# Patient Record
Sex: MALE | Race: WHITE | Employment: PART TIME | URBAN - METROPOLITAN AREA
[De-identification: names, ages, dates, MRNs, and addresses within clinical notes are randomized per-mention and may not be internally consistent; named-entity substitution may affect disease eponyms.]

---
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
## 2019-07-12 ENCOUNTER — OFFICE VISIT (OUTPATIENT)
Dept: INTERNAL MEDICINE | Facility: CLINIC | Age: 19
End: 2019-07-12
Payer: COMMERCIAL

## 2019-07-12 VITALS
HEART RATE: 87 BPM | WEIGHT: 131 LBS | SYSTOLIC BLOOD PRESSURE: 126 MMHG | TEMPERATURE: 98.3 F | DIASTOLIC BLOOD PRESSURE: 76 MMHG | BODY MASS INDEX: 17.74 KG/M2 | HEIGHT: 72 IN | OXYGEN SATURATION: 97 % | RESPIRATION RATE: 22 BRPM

## 2019-07-12 DIAGNOSIS — Z00.00 ROUTINE GENERAL MEDICAL EXAMINATION AT A HEALTH CARE FACILITY: Primary | ICD-10-CM

## 2019-07-12 DIAGNOSIS — Z11.4 ENCOUNTER FOR SCREENING FOR HIV: ICD-10-CM

## 2019-07-12 DIAGNOSIS — Z23 NEED FOR HPV VACCINATION: ICD-10-CM

## 2019-07-12 DIAGNOSIS — Z11.3 SCREEN FOR STD (SEXUALLY TRANSMITTED DISEASE): ICD-10-CM

## 2019-07-12 PROBLEM — F19.20 POLYSUBSTANCE DEPENDENCE (H): Status: ACTIVE | Noted: 2017-10-05

## 2019-07-12 LAB
ALBUMIN SERPL-MCNC: 4.1 G/DL (ref 3.4–5)
ALP SERPL-CCNC: 77 U/L (ref 40–150)
ALT SERPL W P-5'-P-CCNC: 14 U/L (ref 0–70)
ANION GAP SERPL CALCULATED.3IONS-SCNC: 6 MMOL/L (ref 3–14)
AST SERPL W P-5'-P-CCNC: 9 U/L (ref 0–45)
BILIRUB SERPL-MCNC: 0.8 MG/DL (ref 0.2–1.3)
BUN SERPL-MCNC: 7 MG/DL (ref 7–30)
CALCIUM SERPL-MCNC: 8.9 MG/DL (ref 8.5–10.1)
CHLORIDE SERPL-SCNC: 108 MMOL/L (ref 94–109)
CO2 SERPL-SCNC: 29 MMOL/L (ref 20–32)
CREAT SERPL-MCNC: 0.95 MG/DL (ref 0.66–1.25)
ERYTHROCYTE [DISTWIDTH] IN BLOOD BY AUTOMATED COUNT: 12.8 % (ref 10–15)
GFR SERPL CREATININE-BSD FRML MDRD: >90 ML/MIN/{1.73_M2}
GLUCOSE SERPL-MCNC: 89 MG/DL (ref 70–99)
HCT VFR BLD AUTO: 42.9 % (ref 40–53)
HGB BLD-MCNC: 14.7 G/DL (ref 13.3–17.7)
MCH RBC QN AUTO: 30.2 PG (ref 26.5–33)
MCHC RBC AUTO-ENTMCNC: 34.3 G/DL (ref 31.5–36.5)
MCV RBC AUTO: 88 FL (ref 78–100)
PLATELET # BLD AUTO: 252 10E9/L (ref 150–450)
POTASSIUM SERPL-SCNC: 3.6 MMOL/L (ref 3.4–5.3)
PROT SERPL-MCNC: 6.8 G/DL (ref 6.8–8.8)
RBC # BLD AUTO: 4.87 10E12/L (ref 4.4–5.9)
SODIUM SERPL-SCNC: 143 MMOL/L (ref 133–144)
WBC # BLD AUTO: 9.6 10E9/L (ref 4–11)

## 2019-07-12 PROCEDURE — 87389 HIV-1 AG W/HIV-1&-2 AB AG IA: CPT | Performed by: INTERNAL MEDICINE

## 2019-07-12 PROCEDURE — 87491 CHLMYD TRACH DNA AMP PROBE: CPT | Performed by: INTERNAL MEDICINE

## 2019-07-12 PROCEDURE — 80053 COMPREHEN METABOLIC PANEL: CPT | Performed by: INTERNAL MEDICINE

## 2019-07-12 PROCEDURE — 90471 IMMUNIZATION ADMIN: CPT | Performed by: INTERNAL MEDICINE

## 2019-07-12 PROCEDURE — 87591 N.GONORRHOEAE DNA AMP PROB: CPT | Performed by: INTERNAL MEDICINE

## 2019-07-12 PROCEDURE — 90651 9VHPV VACCINE 2/3 DOSE IM: CPT | Performed by: INTERNAL MEDICINE

## 2019-07-12 PROCEDURE — 85027 COMPLETE CBC AUTOMATED: CPT | Performed by: INTERNAL MEDICINE

## 2019-07-12 PROCEDURE — 36415 COLL VENOUS BLD VENIPUNCTURE: CPT | Performed by: INTERNAL MEDICINE

## 2019-07-12 PROCEDURE — 99395 PREV VISIT EST AGE 18-39: CPT | Mod: 25 | Performed by: INTERNAL MEDICINE

## 2019-07-12 RX ORDER — CETIRIZINE HYDROCHLORIDE 10 MG/1
10 TABLET ORAL DAILY PRN
COMMUNITY
Start: 2019-07-12

## 2019-07-12 ASSESSMENT — PATIENT HEALTH QUESTIONNAIRE - PHQ9
10. IF YOU CHECKED OFF ANY PROBLEMS, HOW DIFFICULT HAVE THESE PROBLEMS MADE IT FOR YOU TO DO YOUR WORK, TAKE CARE OF THINGS AT HOME, OR GET ALONG WITH OTHER PEOPLE: NOT DIFFICULT AT ALL
SUM OF ALL RESPONSES TO PHQ QUESTIONS 1-9: 8
SUM OF ALL RESPONSES TO PHQ QUESTIONS 1-9: 8

## 2019-07-12 ASSESSMENT — MIFFLIN-ST. JEOR: SCORE: 1642.21

## 2019-07-12 NOTE — PROGRESS NOTES
Dr España's note        ASSESSMENT & PLAN:                                                      (Z00.00) Routine general medical examination at a health care facility  (primary encounter diagnosis)  Comment:   Plan: CBC with platelets, Comprehensive metabolic         panel            (R79.89) Creatinine elevation  Comment:   Plan: CBC with platelets, Comprehensive metabolic         panel            (Z11.3) Screen for STD (sexually transmitted disease)  Comment:   Plan: NEISSERIA GONORRHOEA PCR, CHLAMYDIA TRACHOMATIS        PCR            (Z23) Need for HPV vaccination  Comment:   Plan: HPV, IM (9 - 26 YRS) - Gardasil 9,         THER/PROPH/DIAG INJ, SC/IM            (Z11.4) Encounter for screening for HIV  Comment:   Plan: **HIV Antigen Antibody Combo FUTURE anytime                 Chief Complaint:                                                      Annual exam  Follow up chronic medical problems      SUBJECTIVE:                                                    History of present illness     We reviewed the chronic medical problems as above.   I reviewed the recent tests results in Epic     Dec labs abnormal - labs abnormal.     *Possible poison ivy on both lower legs? Was walking through the woods last night and the night before, noticed it this morning.       ROS:    See below      PMHx: - reviewed  Past Medical History:   Diagnosis Date     ADHD (attention deficit hyperactivity disorder)      Depression      Depressive disorder      Uncomplicated asthma          PSHx: reviewed  History reviewed. No pertinent surgical history.     Soc Hx: No daily alcohol, no smoking  Social History     Socioeconomic History     Marital status: Single     Spouse name: Not on file     Number of children: Not on file     Years of education: Not on file     Highest education level: Not on file   Occupational History     Not on file   Social Needs     Financial resource strain: Not on file     Food insecurity:     Worry: Not on  file     Inability: Not on file     Transportation needs:     Medical: Not on file     Non-medical: Not on file   Tobacco Use     Smoking status: Light Tobacco Smoker     Years: 0.60     Types: Cigarettes     Smokeless tobacco: Never Used   Substance and Sexual Activity     Alcohol use: Yes     Drug use: Yes     Types: Marijuana     Sexual activity: Yes     Partners: Female     Comment: catarino march 2015   Lifestyle     Physical activity:     Days per week: Not on file     Minutes per session: Not on file     Stress: Not on file   Relationships     Social connections:     Talks on phone: Not on file     Gets together: Not on file     Attends Taoist service: Not on file     Active member of club or organization: Not on file     Attends meetings of clubs or organizations: Not on file     Relationship status: Not on file     Intimate partner violence:     Fear of current or ex partner: Not on file     Emotionally abused: Not on file     Physically abused: Not on file     Forced sexual activity: Not on file   Other Topics Concern     Parent/sibling w/ CABG, MI or angioplasty before 65F 55M? Not Asked   Social History Narrative     Not on file        Fam Hx: reviewed  Family History   Problem Relation Age of Onset     Diabetes Maternal Grandfather      Prostate Cancer Maternal Grandfather      Myocardial Infarction Maternal Grandfather      Thyroid Disease Paternal Grandfather      Myocardial Infarction Paternal Grandfather      Eye Disorder Paternal Aunt      Cancer Paternal Grandmother         smoker     Lung Cancer Paternal Grandmother      Hypertension Maternal Grandmother      Alzheimer Disease Maternal Grandmother      Hypertension Maternal Uncle      Hypertension Paternal Aunt      Neurologic Disorder Brother         ADHD, sees Cortney Canchola     No Known Problems Mother      No Known Problems Father          Screening: reviewed    All: reviewed    Meds: reviewed  Current Outpatient Medications   Medication Sig  Dispense Refill     cetirizine (ZYRTEC) 10 MG tablet Take 1 tablet (10 mg) by mouth daily as needed for allergies             OBJECTIVE:                                                    Physical Exam :  Blood pressure 126/76, pulse 87, temperature 98.3  F (36.8  C), temperature source Oral, resp. rate 22, height 1.829 m (6'), weight 59.4 kg (131 lb), SpO2 97 %.   NAD, appears comfortable  Skin clear, no rashes  HEENT: PERRLA, EOMI, anicteric sclera, pink conjunctiva, external ears appear normal, bilateral tympanic membranes clinically normal, oropharynx normal color.  Neck: supple, no JVD,  no thyroidmegaly  Lymph nodes non palpable in the cervical, supraclavicular axillaries,   Chest: clear to auscultation with good respiratory effort  Cardiac: S1S2, RRR, no mgr appreciated  Abdomen: soft, not tender, not distended, audible bowel sound, no hepatosplenomegaly, no palpable masses, no abdominal bruits  Extremities: no cyanosis, clubbing or edema.   Neuro: A, Ox3, no focal signs.  Breast exam no gynecomastia, no masses  Genital exam.  He denies any genital lesions, testicular abnormality      Cris España MD  Internal Medicine     SUBJECTIVE:   CC: Dylan Montanez is an 19 year old male who presents for preventative health visit.     *Possible poison ivy on both lower legs? Was walking through the woods last night and the night before, noticed it this morning.       Healthy Habits:     Getting at least 3 servings of Calcium per day:  NO    Bi-annual eye exam:  Yes    Dental care twice a year:  NO    Sleep apnea or symptoms of sleep apnea:  None    Diet:  Regular (no restrictions)    Frequency of exercise:  2-3 days/week    Duration of exercise:  15-30 minutes    Taking medications regularly:  Yes    Medication side effects:  None    PHQ-2 Total Score: 2    Additional concerns today:  No      Today's PHQ-2 Score:   PHQ-2 ( 1999 Pf[employer]) 7/12/2019   Q1: Little interest or pleasure in doing things 1   Q2: Feeling  "down, depressed or hopeless 1   PHQ-2 Score 2   Q1: Little interest or pleasure in doing things Several days   Q2: Feeling down, depressed or hopeless Several days   PHQ-2 Score 2       Abuse: Current or Past(Physical, Sexual or Emotional)- No  Do you feel safe in your environment? Yes    Social History     Tobacco Use     Smoking status: Light Tobacco Smoker     Years: 0.60     Types: Cigarettes     Smokeless tobacco: Never Used   Substance Use Topics     Alcohol use: Yes     If you drink alcohol do you typically have >3 drinks per day or >7 drinks per week? No    Alcohol Use 7/12/2019   Prescreen: >3 drinks/day or >7 drinks/week? No       Last PSA: No results found for: PSA    Reviewed orders with patient. Reviewed health maintenance and updated orders accordingly - Yes  Labs reviewed in EPIC    Reviewed and updated as needed this visit by clinical staff  Tobacco  Allergies  Med Hx  Surg Hx  Fam Hx  Soc Hx        Reviewed and updated as needed this visit by Provider        Review of Systems   Genitourinary: Negative for discharge and impotence.   Skin: Positive for rash.         COUNSELING:   Reviewed preventive health counseling, as reflected in patient instructions       Regular exercise       Healthy diet/nutrition    Estimated body mass index is 17 kg/m  as calculated from the following:    Height as of 10/26/18: 1.83 m (6' 0.05\").    Weight as of 10/26/18: 56.9 kg (125 lb 8 oz).          reports that he has been smoking cigarettes.  He has smoked for the past 0.60 years. He has never used smokeless tobacco.      Counseling Resources:  ATP IV Guidelines  Pooled Cohorts Equation Calculator  FRAX Risk Assessment  ICSI Preventive Guidelines  Dietary Guidelines for Americans, 2010  USDA's MyPlate  ASA Prophylaxis  Lung CA Screening    Cris Maher MD  [facility]  "

## 2019-07-12 NOTE — NURSING NOTE
/76 (BP Location: Right arm, Patient Position: Sitting, Cuff Size: Adult Regular)   Pulse 87   Temp 98.3  F (36.8  C) (Oral)   Resp 22   Ht 1.829 m (6')   Wt 59.4 kg (131 lb)   SpO2 97%   BMI 17.77 kg/m    Screening Questionnaire for Adult Immunization    Are you sick today?   No   Do you have allergies to medications, food, a vaccine component or latex?   No   Have you ever had a serious reaction after receiving a vaccination?   No   Do you have a long-term health problem with heart disease, lung disease, asthma, kidney disease, metabolic disease (e.g. diabetes), anemia, or other blood disorder?   No   Do you have cancer, leukemia, HIV/AIDS, or any other immune system problem?   No   In the past 3 months, have you taken medications that affect  your immune system, such as prednisone, other steroids, or anticancer drugs; drugs for the treatment of rheumatoid arthritis, Crohn s disease, or psoriasis; or have you had radiation treatments?   No   Have you had a seizure, or a brain or other nervous system problem?   No   During the past year, have you received a transfusion of blood or blood     products, or been given immune (gamma) globulin or antiviral drug?   No   For women: Are you pregnant or is there a chance you could become        pregnant during the next month?   No   Have you received any vaccinations in the past 4 weeks?   No     Immunization questionnaire answers were all negative.        Per orders of Dr. España, injection of HPV vaccine given by Shima Moise. Patient instructed to remain in clinic for 15 minutes afterwards, and to report any adverse reaction to me immediately.       Screening performed by Shima Moise on 7/12/2019 at 10:40 AM.

## 2019-07-12 NOTE — NURSING NOTE
Second dose of HPV vaccine given today, pt advised he still needs to get another one, but he needs to wait a minimum of 12 weeks before having another one.

## 2019-07-12 NOTE — LETTER
My Depression Action Plan  Name: Dylan Montanez   Date of Birth 2000  Date: 7/12/2019    My doctor: Shalom Cardona   My clinic: [facility] [address]  [facility] [id number] [phone]          GREEN    ZONE   Good Control    What it looks like:     Things are going generally well. You have normal up s and down s. You may even feel depressed from time to time, but bad moods usually last less than a day.   What you need to do:  1. Continue to care for yourself (see self care plan)  2. Check your depression survival kit and update it as needed  3. Follow your physician s recommendations including any medication.  4. Do not stop taking medication unless you consult with your physician first.           YELLOW         ZONE Getting Worse    What it looks like:     Depression is starting to interfere with your life.     It may be hard to get out of bed; you may be starting to isolate yourself from others.    Symptoms of depression are starting to last most all day and this has happened for several days.     You may have suicidal thoughts but they are not constant.   What you need to do:     1. Call your care team, your response to treatment will improve if you keep your care team informed of your progress. Yellow periods are signs an adjustment may need to be made.     2. Continue your self-care, even if you have to fake it!    3. Talk to someone in your support network    4. Open up your depression survival kit           RED    ZONE Medical Alert - Get Help    What it looks like:     Depression is seriously interfering with your life.     You may experience these or other symptoms: You can t get out of bed most days, can t work or engage in other necessary activities, you have trouble taking care of basic hygiene, or basic responsibilities, thoughts of suicide or death that will not go away, self-injurious behavior.     What you need to do:  1. Call your care  team and request a same-day appointment. If they are not available (weekends or after hours) call your local crisis line, emergency room or 911.            Depression Self Care Plan / Survival Kit    Self-Care for Depression  Here s the deal. Your body and mind are really not as separate as most people think.  What you do and think affects how you feel and how you feel influences what you do and think. This means if you do things that people who feel good do, it will help you feel better.  Sometimes this is all it takes.  There is also a place for medication and therapy depending on how severe your depression is, so be sure to consult with your medical provider and/ or Behavioral Health Consultant if your symptoms are worsening or not improving.     In order to better manage my stress, I will:    Exercise  Get some form of exercise, every day. This will help reduce pain and release endorphins, the  feel good  chemicals in your brain. This is almost as good as taking antidepressants!  This is not the same as joining a gym and then never going! (they count on that by the way ) It can be as simple as just going for a walk or doing some gardening, anything that will get you moving.      Hygiene   Maintain good hygiene (Get out of bed in the morning, Make your bed, Brush your teeth, Take a shower, and Get dressed like you were going to work, even if you are unemployed).  If your clothes don't fit try to get ones that do.    Diet  I will strive to eat foods that are good for me, drink plenty of water, and avoid excessive sugar, caffeine, alcohol, and other mood-altering substances.  Some foods that are helpful in depression are: complex carbohydrates, B vitamins, flaxseed, fish or fish oil, fresh fruits and vegetables.    Psychotherapy  I agree to participate in Individual Therapy (if recommended).    Medication  If prescribed medications, I agree to take them.  Missing doses can result in serious side effects.  I  understand that drinking alcohol, or other illicit drug use, may cause potential side effects.  I will not stop my medication abruptly without first discussing it with my provider.    Staying Connected With Others  I will stay in touch with my friends, family members, and my primary care provider/team.    Use your imagination  Be creative.  We all have a creative side; it doesn t matter if it s oil painting, sand castles, or mud pies! This will also kick up the endorphins.    Witness Beauty  (AKA stop and smell the roses) Take a look outside, even in mid-winter. Notice colors, textures. Watch the squirrels and birds.     Service to others  Be of service to others.  There is always someone else in need.  By helping others we can  get out of ourselves  and remember the really important things.  This also provides opportunities for practicing all the other parts of the program.    Humor  Laugh and be silly!  Adjust your TV habits for less news and crime-drama and more comedy.    Control your stress  Try breathing deep, massage therapy, biofeedback, and meditation. Find time to relax each day.     My support system    Clinic Contact:  Phone number:    Contact 1:  Phone number:    Contact 2:  Phone number:    Amish/:  Phone number:    Therapist:  Phone number:    Local crisis center:    Phone number:    Other community support:  Phone number:

## 2019-07-14 ENCOUNTER — OFFICE VISIT (OUTPATIENT)
Dept: URGENT CARE | Facility: URGENT CARE | Age: 19
End: 2019-07-14
Payer: COMMERCIAL

## 2019-07-14 VITALS
SYSTOLIC BLOOD PRESSURE: 110 MMHG | OXYGEN SATURATION: 98 % | BODY MASS INDEX: 17.74 KG/M2 | DIASTOLIC BLOOD PRESSURE: 62 MMHG | HEART RATE: 75 BPM | WEIGHT: 131 LBS | HEIGHT: 72 IN | TEMPERATURE: 99.8 F

## 2019-07-14 DIAGNOSIS — J02.0 STREP THROAT: Primary | ICD-10-CM

## 2019-07-14 DIAGNOSIS — R07.0 THROAT PAIN: ICD-10-CM

## 2019-07-14 LAB
C TRACH DNA SPEC QL NAA+PROBE: NEGATIVE
DEPRECATED S PYO AG THROAT QL EIA: ABNORMAL
N GONORRHOEA DNA SPEC QL NAA+PROBE: NEGATIVE
SPECIMEN SOURCE: ABNORMAL
SPECIMEN SOURCE: NORMAL
SPECIMEN SOURCE: NORMAL

## 2019-07-14 PROCEDURE — 87880 STREP A ASSAY W/OPTIC: CPT | Performed by: PHYSICIAN ASSISTANT

## 2019-07-14 PROCEDURE — 99213 OFFICE O/P EST LOW 20 MIN: CPT | Performed by: FAMILY MEDICINE

## 2019-07-14 RX ORDER — AMOXICILLIN 875 MG
875 TABLET ORAL 2 TIMES DAILY
Qty: 20 TABLET | Refills: 0 | Status: SHIPPED | OUTPATIENT
Start: 2019-07-14 | End: 2019-07-24

## 2019-07-14 ASSESSMENT — MIFFLIN-ST. JEOR: SCORE: 1642.21

## 2019-07-14 NOTE — PROGRESS NOTES
SUBJECTIVE:   Dylan Montanez is a 19 year old male presenting with a chief complaint of sore throat.  Symptoms started 7/12 evening and he was seen yesterday at another  and had a negative strep test at that time.  There are here today as he took some advil with water this morning for the sore throat and vomited that up shortly after and noticed what appeared to be a streak of blood in the emesis.   No other blood noted.  No abdominal pain.  No dark/tarry stools.  No fevers.   No cough or nasal symptoms.   No nausea and no other vomiting, no diarrhea.   Needs a note for work.       ROS:  5-Point Review of Systems Negative-- Except as stated above.    OBJECTIVE  /62 (BP Location: Right arm, Patient Position: Chair, Cuff Size: Adult Regular)   Pulse 75   Temp 99.8  F (37.7  C) (Oral)   Ht 1.829 m (6')   Wt 59.4 kg (131 lb)   SpO2 98%   BMI 17.77 kg/m    GENERAL:  Awake, alert and interactive. No acute distress.  HEENT:   NC/AT, EOMI, clear conjunctiva.  Nose clear discharge.  Oropharynx erythematous without exudate, no blood noted in posterior oropharynx.  TM's and EAC's benign.  NECK: supple and free of adenopathy  CHEST:  Lungs are clear, no rhonchi, wheezing or rales. Normal symmetric air entry throughout both lung fields.   HEART:  S1 and S2 normal, no murmurs, clicks, gallops or rubs. Regular rate and rhythm.    Results for orders placed or performed in visit on 07/14/19   Strep, Rapid Screen   Result Value Ref Range    Specimen Description Throat     Rapid Strep A Screen (A)      POSITIVE: Group A Streptococcal antigen detected by immunoassay.       ASSESSMENT/PLAN    ICD-10-CM    1. Strep throat J02.0 amoxicillin (AMOXIL) 875 MG tablet   2. Throat pain R07.0 Strep, Rapid Screen       Suspect emesis over the irritated throat may be were the small blood streak came from.  Discussed if ANY further blood noted, to recheck with PCP/return to care.  Home for 36 hrs.  New toothbrush after 3 days on the  antibiotic.   We discussed the expected course and symptomatic cares in detail, including return to care if symptoms not improving as expected, do not resolve completely, or if any new or worsening symptoms develop.    Patient Instructions   Start the antibiotic this morning.   Home until Monday evening.   New toothbrush after 3 days.   Return to care if any fevers (a temperature of 100.5 or above) develop after you've been on the antibiotic more than 48 hours.

## 2019-07-14 NOTE — LETTER
REPORT OF WORK ABILITY    [facility]   [address], [place]  [address]  [phone]    July 14, 2019    To whom it may concern:  Dylan Montanez has been seen and evaluated today for medical reasons.  He will be able to return to work on Tuesday, July 16th.        Sincerely,  Suzette Obrien

## 2019-07-15 LAB — HIV 1+2 AB+HIV1 P24 AG SERPL QL IA: NONREACTIVE

## 2019-08-11 ENCOUNTER — FCC EXTENDED DOCUMENTATION (OUTPATIENT)
Dept: PSYCHOLOGY | Facility: CLINIC | Age: 19
End: 2019-08-11

## 2019-08-11 NOTE — PROGRESS NOTES
Discharge Summary  Multiple Sessions    Client Name: Dylan Montanez MRN#: [id number] YOB: 2000      Intake / Discharge Date: 07/18/2018      DSM5 Diagnoses: (Sustained by DSM5 Criteria Listed Above)  Diagnoses: Attention-Deficit/Hyperactivity Disorder  314.00 (F90.0) Predominantly inattentive presentation  296.32 (F33.1) Major Depressive Disorder, Recurrent Episode, Moderate With anxious distress  Psychosocial & Contextual Factors: substance use, living in and out of parents home, family discord   WHODAS 2.0 (12 item) Score: n/a          Presenting Concern:  History of ADD and behavior problems resulting in family conflict.       Reason for Discharge:  Noncompliance and too many missed appointments.      Disposition at Time of Last Encounter:   Comments:   Continued difficulty with managing symptoms resulting in reduced functioning. Difficulty engaging in therapy at this time.      Risk Management:   Client denies a history of suicidal ideation, suicide attempts, self-injurious behavior, homicidal ideation, homicidal behavior and and other safety concerns  Recommended that patient call 911 or go to the local ED should there be a change in any of these risk factors.      Referred To:  Recommended client return to therapy for ongoing therapy and support. Participate in a 12 step support program for support with sobriety.        Anna Augustin, [facility]   8/11/2019

## 2019-10-03 ENCOUNTER — HEALTH MAINTENANCE LETTER (OUTPATIENT)
Age: 19
End: 2019-10-03

## 2020-10-05 ENCOUNTER — VIRTUAL VISIT (OUTPATIENT)
Dept: FAMILY MEDICINE | Facility: OTHER | Age: 20
End: 2020-10-05
Payer: COMMERCIAL

## 2020-10-05 PROCEDURE — 99421 OL DIG E/M SVC 5-10 MIN: CPT | Performed by: PHYSICIAN ASSISTANT

## 2020-10-05 NOTE — PROGRESS NOTES
"Date: 10/05/2020 14:43:00  Clinician: Benjamin Daniels  Clinician NPI: [id number]  Patient: Dylan Montanez  Patient : 2000  Patient [place] [address] [place] [address]  Patient Phone: [phone]  Visit Protocol: URI  Patient Summary:  Dylan is a 20 year old ( : 2000 ) male who initiated a [facility] Visit for COVID-19 (Coronavirus) evaluation and screening. When asked the question \"Please sign me up to receive news, health information and promotions from [facility].\", Dylan responded \"No\".    Dylan states his symptoms started today.   His symptoms consist of malaise and a sore throat. Dlyan also feels feverish.   Symptom details     Sore throat: Dylan reports having moderate throat pain (4-6 on a 10 point pain scale), does not have exudate on his tonsils, and can swallow liquids. The lymph nodes in his neck are not enlarged. A rash has not appeared on the skin since the sore throat started.     Temperature: His current temperature is 101.4 degrees Fahrenheit. Dylan has had a temperature over 100 degrees Fahrenheit for 1-2 days.      Dylan denies having ear pain, headache, wheezing, enlarged lymph nodes, cough, nasal congestion, anosmia, vomiting, rhinitis, nausea, facial pain or pressure, myalgias, chills, teeth pain, ageusia, and diarrhea. He also denies taking antibiotic medication in the past month and having recent facial or sinus surgery in the past 60 days. He is not experiencing dyspnea.   Precipitating events  Within the past week, Dylan has not been exposed to someone with strep throat. He has not recently been exposed to someone with influenza. Dylan has not been in close contact with any high risk individuals.   Pertinent COVID-19 (Coronavirus) information  In the past 14 days, Dylan has not worked in a congregate living setting.   He does not work or volunteer as healthcare worker or a  and does not work or volunteer in a healthcare facility.   " Dylan also has not lived in a congregate living setting in the past 14 days. He does not live with a healthcare worker.   Dylan has not had a close contact with a laboratory-confirmed COVID-19 patient within 14 days of symptom onset.   Since December 2019, Dylan and has not had upper respiratory infection or influenza-like illness. Has not been diagnosed with lab-confirmed COVID-19 test   Pertinent medical history  Dylan needs a return to work/school note.   Weight: 135 lbs   Dylan smokes or uses smokeless tobacco.   Weight: 135 lbs    MEDICATIONS: No current medications, ALLERGIES: NKDA  Clinician Response:  Dear Dylan,   Your symptoms show that you may have coronavirus (COVID-19). This illness can cause fever, cough and trouble breathing. Many people get a mild case and get better on their own. Some people can get very sick.  What should I do?  We would like to test you for this virus.   1. Please call [phone] to schedule your visit. Explain that you were referred by [facility] to have a COVID-19 test. Be ready to share your [facility] visit ID number.  The following will serve as your written order for this COVID Test, ordered by me, for the indication of suspected COVID [Z20.828]: The test will be ordered in [facility], our electronic health record, after you are scheduled. It will show as ordered and authorized by Oh Linder MD.  Order: COVID-19 (Coronavirus) PCR for SYMPTOMATIC testing from [facility].      2. When it's time for your COVID test:  Stay at least 6 feet away from others. (If someone will drive you to your test, stay in the backseat, as far away from the  as you can.)   Cover your mouth and nose with a mask, tissue or washcloth.  Go straight to the testing site. Don't make any stops on the way there or back.      3.Starting now: Stay home and away from others (self-isolate) until:   You've had no fever---and no medicine that reduces fever---for one full day (24 hours). And...   Your other  "symptoms have gotten better. For example, your cough or breathing has improved. And...   At least 10 days have passed since your symptoms started.       During this time, don't leave the house except for testing or medical care.   Stay in your own room, even for meals. Use your own bathroom if you can.   Stay away from others in your home. No hugging, kissing or shaking hands. No visitors.  Don't go to work, school or anywhere else.    Clean \"high touch\" surfaces often (doorknobs, counters, handles, etc.). Use a household cleaning spray or wipes. You'll find a full list of  on the EPA website: www.epa.gov/pesticide-registration/list-n-disinfectants-use-against-sars-cov-2.   Cover your mouth and nose with a mask, tissue or washcloth to avoid spreading germs.  Wash your hands and face often. Use soap and water.  Caregivers in these groups are at risk for severe illness due to COVID-19:  o People 65 years and older  o People who live in a nursing home or long-term care facility  o People with chronic disease (lung, heart, cancer, diabetes, kidney, liver, immunologic)  o People who have a weakened immune system, including those who:   Are in cancer treatment  Take medicine that weakens the immune system, such as corticosteroids  Had a bone marrow or organ transplant  Have an immune deficiency  Have poorly controlled HIV or AIDS  Are obese (body mass index of 40 or higher)  Smoke regularly   o Caregivers should wear gloves while washing dishes, handling laundry and cleaning bedrooms and bathrooms.  o Use caution when washing and drying laundry: Don't shake dirty laundry, and use the warmest water setting that you can.  o For more tips, go to www.cdc.gov/coronavirus/2019-ncov/downloads/10Things.pdf.       How can I take care of myself?   Get lots of rest. Drink extra fluids (unless a doctor has told you not to).   Take Tylenol (acetaminophen) for fever or pain. If you have liver or kidney problems, ask your family " doctor if it's okay to take Tylenol.   Adults can take either:    650 mg (two 325 mg pills) every 4 to 6 hours, or...   1,000 mg (two 500 mg pills) every 8 hours as needed.    Note: Don't take more than 3,000 mg in one day. Acetaminophen is found in many medicines (both prescribed and over-the-counter medicines). Read all labels to be sure you don't take too much.   For children, check the Tylenol bottle for the right dose. The dose is based on the child's age or weight.    If you have other health problems (like cancer, heart failure, an organ transplant or severe kidney disease): Call your specialty clinic if you don't feel better in the next 2 days.       Know when to call 911. Emergency warning signs include:    Trouble breathing or shortness of breath Pain or pressure in the chest that doesn't go away Feeling confused like you haven't felt before, or not being able to wake up Bluish-colored lips or face.  Where can I get more information?   [facility] -- About COVID-19: [email or web address]   CDC -- What to Do If You're Sick: www.cdc.gov/coronavirus/2019-ncov/about/steps-when-sick.html   CDC -- Ending Home Isolation: www.cdc.gov/coronavirus/2019-ncov/hcp/disposition-in-home-patients.html   CDC -- Caring for Someone: www.cdc.gov/coronavirus/2019-ncov/if-you-are-sick/care-for-someone.html   [facility] -- Interim Guidance for Hospital Discharge to Home: www.health.[facility].[place]/diseases/coronavirus/hcp/hospdischarge.pdf   [facility] clinical trials (COVID-19 research studies): [email or web address].[email or web address].edu/umn-clinical-trials    Below are the COVID-19 hotlines at the [place] Department of Health ([facility]). Interpreters are available.    For health questions: Call 382-994-4754 or 1-718.413.3727 (7 a.m. to 7 p.m.) For questions about schools and childcare: Call 995-053-8722 or 1-948.395.4658 (7 a.m. to 7 p.m.)    Diagnosis: Pain in throat  Diagnosis ICD: R07.0

## 2020-10-06 DIAGNOSIS — Z20.822 SUSPECTED 2019 NOVEL CORONAVIRUS INFECTION: Primary | ICD-10-CM

## 2020-10-07 DIAGNOSIS — Z20.822 SUSPECTED 2019 NOVEL CORONAVIRUS INFECTION: ICD-10-CM

## 2020-10-07 PROCEDURE — U0003 INFECTIOUS AGENT DETECTION BY NUCLEIC ACID (DNA OR RNA); SEVERE ACUTE RESPIRATORY SYNDROME CORONAVIRUS 2 (SARS-COV-2) (CORONAVIRUS DISEASE [COVID-19]), AMPLIFIED PROBE TECHNIQUE, MAKING USE OF HIGH THROUGHPUT TECHNOLOGIES AS DESCRIBED BY CMS-2020-01-R: HCPCS | Performed by: FAMILY MEDICINE

## 2020-10-08 LAB
SARS-COV-2 RNA SPEC QL NAA+PROBE: NOT DETECTED
SPECIMEN SOURCE: NORMAL

## 2020-11-07 ENCOUNTER — HEALTH MAINTENANCE LETTER (OUTPATIENT)
Age: 20
End: 2020-11-07

## 2021-09-05 ENCOUNTER — HEALTH MAINTENANCE LETTER (OUTPATIENT)
Age: 21
End: 2021-09-05

## 2021-12-26 ENCOUNTER — HEALTH MAINTENANCE LETTER (OUTPATIENT)
Age: 21
End: 2021-12-26

## 2022-03-25 ENCOUNTER — OFFICE VISIT (OUTPATIENT)
Dept: FAMILY MEDICINE | Facility: CLINIC | Age: 22
End: 2022-03-25
Payer: COMMERCIAL

## 2022-03-25 VITALS
SYSTOLIC BLOOD PRESSURE: 120 MMHG | HEIGHT: 72 IN | HEART RATE: 61 BPM | OXYGEN SATURATION: 97 % | DIASTOLIC BLOOD PRESSURE: 73 MMHG | RESPIRATION RATE: 16 BRPM | TEMPERATURE: 97.7 F | WEIGHT: 130 LBS | BODY MASS INDEX: 17.61 KG/M2

## 2022-03-25 DIAGNOSIS — J02.9 PHARYNGITIS, UNSPECIFIED ETIOLOGY: Primary | ICD-10-CM

## 2022-03-25 LAB — MONOCYTES NFR BLD AUTO: NEGATIVE %

## 2022-03-25 PROCEDURE — 86308 HETEROPHILE ANTIBODY SCREEN: CPT | Performed by: PHYSICIAN ASSISTANT

## 2022-03-25 PROCEDURE — 36415 COLL VENOUS BLD VENIPUNCTURE: CPT | Performed by: PHYSICIAN ASSISTANT

## 2022-03-25 PROCEDURE — 99213 OFFICE O/P EST LOW 20 MIN: CPT | Performed by: PHYSICIAN ASSISTANT

## 2022-03-25 ASSESSMENT — PAIN SCALES - GENERAL: PAINLEVEL: MODERATE PAIN (4)

## 2022-03-25 NOTE — PROGRESS NOTES
Assessment and Plan:     (J02.9) Pharyngitis, unspecified etiology  (primary encounter diagnosis)  Comment: Typically gets about 2 bad sore throats per year, skilled nursing through a 10d course of amoxicillin and overall doing better, no evidence of peritonsillar abscess, will test for mono, he would like referral to ENT doctor about potentially getting a tonsillectomy  Plan: Mononucleosis screen, Otolaryngology Referral  Continue with symptomatic cares, discussed when to be seen promptly      Eva Curran PA-C        Keven Richardson is a 22 year old who presents for the following health issues     Has had a sore throat for one week  He went to  last Sunday 5 days ago and was given amoxicillin   He was tested for strep, covid and flu  He does not think he was tested for mono  [facility] went [facility]    He is still on amoxicillin, he is skilled nursing done  Symptoms are overall improving but not resolved  The sore throat is symmetrical   He did notice a voice change in the first few days of illness, this resolved with amoxicillin  He denies any trouble with secretions/drooling, ear pain   He denies sob  He had a fever of 101.2, 5 days ago, fever has since resolved    Review of Systems   See above      Objective      /73 (BP Location: Right arm, Patient Position: Chair, Cuff Size: Adult Large)   Pulse 61   Temp 97.7  F (36.5  C) (Tympanic)   Resp 16   Ht 1.829 m (6')   Wt 59 kg (130 lb)   SpO2 97%   BMI 17.63 kg/m        Physical Exam     EXAM:  GENERAL APPEARANCE: healthy, alert and no distress, very pleasant  EYES: Eyes grossly normal to inspection, conjunctivae and sclerae normal  HENT: ear canals and TMs normal and nose and mouth without ulcers or lesions, oropharynx w/mild erythema and mild symmetrical tonsillar swelling, no exudate  NECK: no adenopathy, no asymmetry, masses,supple  RESP: lungs clear to auscultation - no rales, rhonchi or wheezes  CV: regular rates and rhythm, normal S1 S2, no S3 or  S4 and no murmur, click or rub  ABD: soft, NT, no HSM

## 2022-03-25 NOTE — RESULT ENCOUNTER NOTE
Dear Dylan,     Here are your recent results which are negative for mono.    Please let us know if you have any questions or concerns.    Regards,  Eva Curran PA-C

## 2022-03-25 NOTE — PATIENT INSTRUCTIONS
Continue amoxicillin    Test for mono today     Get plenty of fluids and rest     Take tylenol as needed for pain up to 1000mg three times daily, do not exceed 3000mg in 24 hour period    Take ibuprofen as needed for pain up to 600mg three times daily with food

## 2022-03-25 NOTE — LETTER
March 25, 2022      Dylan Montanez  [address]   [facility] [place] [facility] [address]        To Whom It May Concern:    Dylan Montanez was seen in our clinic. He may return to work without restrictions on 3/28/22.      Sincerely,        Eva Curran PA-C

## 2022-04-07 ENCOUNTER — OFFICE VISIT (OUTPATIENT)
Dept: OTOLARYNGOLOGY | Facility: CLINIC | Age: 22
End: 2022-04-07
Attending: PHYSICIAN ASSISTANT
Payer: COMMERCIAL

## 2022-04-07 DIAGNOSIS — J35.01 CHRONIC TONSILLITIS: Primary | ICD-10-CM

## 2022-04-07 PROCEDURE — 99243 OFF/OP CNSLTJ NEW/EST LOW 30: CPT | Performed by: OTOLARYNGOLOGY

## 2022-04-07 RX ORDER — SODIUM FLUORIDE 6 MG/ML
PASTE, DENTIFRICE DENTAL
COMMUNITY
Start: 2021-05-11

## 2022-04-07 NOTE — LETTER
4/7/2022         RE: Dylan Montanez  [address]  [place] [place] [place] [address]        Dear Colleague,    Thank you for referring your patient, Dylan Montanez, to the [facility]. Please see a copy of my visit note below.    HPI: This patient is a 21yo M who presents for evaluation of the tonsils at the request of Eva Curran PA-C. He gets a few, severe sore throats per year for the past several years. Also has tonsil stones. Otherwise healthy and without fever, weight loss, odynophagia, dysphagia, hemoptysis, shortness of breath, or other major symptoms.    Past medical history, surgical history, social history, family history, medications, and allergies have been reviewed with the patient and are documented above.    Review of Systems: a 10-system review was performed. Pertinent positives are noted in the HPI and on a separate scanned document in the chart.    PHYSICAL EXAMINATION:  GEN: no acute distress, normocephalic  EYES: extraocular movements are intact, pupils are equal and round. Sclera clear.   EARS: auricles are normally formed. The external auditory canals are clear with minimal to no cerumen. Tympanic membranes are intact bilaterally with no signs of infection, effusion, retractions, or perforations.  NOSE: anterior nares are patent.   OC/OP: clear, dentition is in good repair. The tongue and palate are fully mobile and symmetric. Tonsils are 1.5+  NECK: soft and supple. No lymphadenopathy or masses. Airway is midline.  NEURO: CN VII and XII symmetric. alert and oriented. No spontaneous nystagmus. Gait is normal.  PULM: breathing comfortably on room air, normal chest expansion with respiration  CARDS: no cyanosis or clubbing, normal carotid pulses    MEDICAL DECISION-MAKING: Dylan is a 21yo M with chronic tonsillitis who would benefit from a tonsillectomy. The risks and benefits were discussed and the patient will schedule at their  convenience.        Again, thank you for allowing me to participate in the care of your patient.        Sincerely,        Kaelyn Kidd MD     EOMI; PERRL; no drainage or redness

## 2022-04-07 NOTE — PROGRESS NOTES
HPI: This patient is a 21yo M who presents for evaluation of the tonsils at the request of Eva Curran PA-C. He gets a few, severe sore throats per year for the past several years. Also has tonsil stones. Otherwise healthy and without fever, weight loss, odynophagia, dysphagia, hemoptysis, shortness of breath, or other major symptoms.    Past medical history, surgical history, social history, family history, medications, and allergies have been reviewed with the patient and are documented above.    Review of Systems: a 10-system review was performed. Pertinent positives are noted in the HPI and on a separate scanned document in the chart.    PHYSICAL EXAMINATION:  GEN: no acute distress, normocephalic  EYES: extraocular movements are intact, pupils are equal and round. Sclera clear.   EARS: auricles are normally formed. The external auditory canals are clear with minimal to no cerumen. Tympanic membranes are intact bilaterally with no signs of infection, effusion, retractions, or perforations.  NOSE: anterior nares are patent.   OC/OP: clear, dentition is in good repair. The tongue and palate are fully mobile and symmetric. Tonsils are 1.5+  NECK: soft and supple. No lymphadenopathy or masses. Airway is midline.  NEURO: CN VII and XII symmetric. alert and oriented. No spontaneous nystagmus. Gait is normal.  PULM: breathing comfortably on room air, normal chest expansion with respiration  CARDS: no cyanosis or clubbing, normal carotid pulses    MEDICAL DECISION-MAKING: Dylan is a 21yo M with chronic tonsillitis who would benefit from a tonsillectomy. The risks and benefits were discussed and the patient will schedule at their convenience.

## 2022-04-12 ENCOUNTER — HOSPITAL ENCOUNTER (OUTPATIENT)
Facility: CLINIC | Age: 22
End: 2022-04-12
Attending: OTOLARYNGOLOGY | Admitting: OTOLARYNGOLOGY
Payer: COMMERCIAL

## 2022-04-12 DIAGNOSIS — J35.01 CHRONIC TONSILLITIS: ICD-10-CM

## 2022-10-23 ENCOUNTER — HEALTH MAINTENANCE LETTER (OUTPATIENT)
Age: 22
End: 2022-10-23

## 2023-04-02 ENCOUNTER — HEALTH MAINTENANCE LETTER (OUTPATIENT)
Age: 23
End: 2023-04-02

## 2023-10-03 NOTE — TELEPHONE ENCOUNTER
Called and left a message on dad's phone letting him know that Dr. Dinh would like to increase Dylan's Celexa dose to 20 mg. Asked that dad call back to confirm where Rx should be sent.   normal...

## 2024-06-08 ENCOUNTER — HEALTH MAINTENANCE LETTER (OUTPATIENT)
Age: 24
End: 2024-06-08